# Patient Record
Sex: FEMALE | Race: WHITE | Employment: FULL TIME | URBAN - METROPOLITAN AREA
[De-identification: names, ages, dates, MRNs, and addresses within clinical notes are randomized per-mention and may not be internally consistent; named-entity substitution may affect disease eponyms.]

---

## 2017-02-21 ENCOUNTER — TELEPHONE (OUTPATIENT)
Dept: FAMILY MEDICINE | Facility: CLINIC | Age: 52
End: 2017-02-21

## 2017-02-21 NOTE — TELEPHONE ENCOUNTER
Panel Management Review      Patient has the following on her problem list:     Hypertension   Last three blood pressure readings:  BP Readings from Last 3 Encounters:   03/17/16 124/76   02/15/16 138/80   01/05/15 149/89     Blood pressure: Passed    HTN Guidelines:  Age 18-59 BP range:  Less than 140/90  Age 60-85 with Diabetes:  Less than 140/90  Age 60-85 without Diabetes:  less than 150/90      Composite cancer screening  Chart review shows that this patient is due/due soon for the following Pap Smear, Mammogram and Colonoscopy  Summary:    Patient is due/failing the following:   Mammo, pap, colon/FIT    Action needed:   Patient needs office visit for pap. Referral for mammo, colon/FIT    Type of outreach:    Sent letter. - she did not read her last [employer] message that was sent to her    Questions for provider review:    None                                                                                                                                    Laurie DOVE       Chart routed to none .

## 2017-02-21 NOTE — LETTER
[facility]  [address] [id number] [phone]      February 21, 2017      Nata Reyes  [address] [place] [address]        Dear Nata,     As part of [facility]'s commitment to health and wellness we have recently reviewed your chart and your medical record indicates that you are due for one or more of the following:    -- Pap smear. These are recommended every 3 years. Please call our clinic to schedule your pap smear / physical appointment.     -- Mammogram. Please call one of the following numbers to schedule:  [facility] [phone]  [facility] [phone]  [facility] [phone]  [facility] [phone]  [facility] [phone]  [facility] [phone]    -- Colonoscopy or FIT test. Please call one of the following numbers to schedule a colonoscopy:  [facility] [phone]  [facility] [phone]  [facility] [phone]  [facility] [phone] (multiple sites, call for locations)    A colonoscopy is the gold standard but if you are reluctant to do this there is a less invasive and less expensive alternative. FIT (fecal immunochemical testing) is a screening option that is performed on a yearly basis. This is a test to check for blood in the stool, which can be performed in the comfort of your own home. If you are willing to perform this test, we can order the kit for you to  at our lab. When you have completed the test, you simply mail it in the pre-addressed envelope.    Please call us at [phone] if you need an order for a colonoscopy or FIT testing.    Please try to schedule and/or complete the tests above within the next 2-4 weeks.   The number to call to schedule an appointment at [facility] is [phone].    While we work hard to maintain accurate records on all our patients, it is always possible that this notice does not accurately reflect  tests that you may have had. To ensure that we do not continue to send you notices please verify, at your next visit or by a MyChart message, that we have accurate dates of your    tests, even if these were done many years ago.     Working together for your health is our goal.    Thank you for choosing [facility] for your healthcare needs.      Sincerely,     FOREIGN Kay / marcell

## 2017-04-10 ENCOUNTER — TELEPHONE (OUTPATIENT)
Dept: FAMILY MEDICINE | Facility: CLINIC | Age: 52
End: 2017-04-10

## 2017-04-10 DIAGNOSIS — I10 BENIGN ESSENTIAL HYPERTENSION: Primary | ICD-10-CM

## 2017-04-10 DIAGNOSIS — D50.8 OTHER IRON DEFICIENCY ANEMIA: ICD-10-CM

## 2017-04-10 DIAGNOSIS — I10 BENIGN ESSENTIAL HYPERTENSION: ICD-10-CM

## 2017-04-10 DIAGNOSIS — E03.9 HYPOTHYROIDISM (ACQUIRED): ICD-10-CM

## 2017-04-10 DIAGNOSIS — Z13.6 CARDIOVASCULAR SCREENING; LDL GOAL LESS THAN 160: ICD-10-CM

## 2017-04-10 RX ORDER — LISINOPRIL 20 MG/1
20 TABLET ORAL DAILY
Qty: 90 TABLET | Refills: 0 | Status: SHIPPED | OUTPATIENT
Start: 2017-04-10 | End: 2017-07-12

## 2017-04-10 NOTE — TELEPHONE ENCOUNTER
Medication is being filled for 1 time refill only due to:   Over due for office visit and/or labs   ROQUE Flynn  RN/Jorge Agrawal

## 2017-04-10 NOTE — TELEPHONE ENCOUNTER
Please provide patient with a small fill as she has her yearly with Tamia Jarrett on 4/27/17     lisinopril      Last Written Prescription Date: 3-17-16  Last Fill Quantity: 90, # refills: 0  Last Office Visit with G, P or [facility] prescribing provider: 3-17-16 with Tamia Jarrett  Next 5 appointments (look out 90 days)     Apr 27, 2017  7:20 AM CDT   SHORT with ALISSA Sanabria [facility]    [address] [address]   [phone]                   Potassium   Date Value Ref Range Status   02/16/2016 4.1 mmol/L Final     Creatinine   Date Value Ref Range Status   02/16/2016 0.88 mg/dL Final     BP Readings from Last 3 Encounters:   03/17/16 124/76   02/15/16 138/80   01/05/15 149/89     [facility] [place] [facility]

## 2017-04-10 NOTE — TELEPHONE ENCOUNTER
Please order needed labs for patient apt on 4-27-17 for her yearly     Patient will call back or fax in number for her work where she gets free lab work done and orders from Tamia Jarrett will need to be fax there .     [facility]

## 2017-04-25 ENCOUNTER — TRANSFERRED RECORDS (OUTPATIENT)
Dept: HEALTH INFORMATION MANAGEMENT | Facility: CLINIC | Age: 52
End: 2017-04-25

## 2017-04-27 ENCOUNTER — OFFICE VISIT (OUTPATIENT)
Dept: FAMILY MEDICINE | Facility: CLINIC | Age: 52
End: 2017-04-27
Payer: COMMERCIAL

## 2017-04-27 ENCOUNTER — TELEPHONE (OUTPATIENT)
Dept: FAMILY MEDICINE | Facility: CLINIC | Age: 52
End: 2017-04-27

## 2017-04-27 VITALS
HEIGHT: 65 IN | WEIGHT: 125.6 LBS | HEART RATE: 61 BPM | DIASTOLIC BLOOD PRESSURE: 45 MMHG | TEMPERATURE: 96.8 F | BODY MASS INDEX: 20.93 KG/M2 | SYSTOLIC BLOOD PRESSURE: 106 MMHG

## 2017-04-27 DIAGNOSIS — Z12.31 ENCOUNTER FOR SCREENING MAMMOGRAM FOR BREAST CANCER: ICD-10-CM

## 2017-04-27 DIAGNOSIS — I10 BENIGN ESSENTIAL HYPERTENSION: Primary | ICD-10-CM

## 2017-04-27 DIAGNOSIS — Z11.59 NEED FOR HEPATITIS C SCREENING TEST: ICD-10-CM

## 2017-04-27 DIAGNOSIS — E03.9 HYPOTHYROIDISM (ACQUIRED): ICD-10-CM

## 2017-04-27 DIAGNOSIS — Z12.11 SPECIAL SCREENING FOR MALIGNANT NEOPLASMS, COLON: ICD-10-CM

## 2017-04-27 PROCEDURE — 99213 OFFICE O/P EST LOW 20 MIN: CPT | Performed by: NURSE PRACTITIONER

## 2017-04-27 RX ORDER — LOSARTAN POTASSIUM 100 MG/1
100 TABLET ORAL DAILY
Qty: 90 TABLET | Refills: 3 | Status: SHIPPED | OUTPATIENT
Start: 2017-04-27 | End: 2018-01-30

## 2017-04-27 NOTE — TELEPHONE ENCOUNTER
Please abstract the following data from this visit with this patient into the appropriate field in Epic:    Labs collected by [facility] 4/25/17    Basic Metabolic Panel   Sodium 139  (136-145)  mmol/L   Potassium  4.3 (3.5-5.1)  mmolL   Chloride 104 () mmol/L   CO2   26 (20-29)   mmol/L    Anion Gap  9 (7-16)  mmol/L   (calc.)   Glucose,  84 () mg/dl   Random       Calcium 9.8 (804-10.2) mg/dl   BUN  14 (7-26)  mg/dl   Creatinine 0.81 (0.55-1.02) mg/dl     GFR, Est. >60 (>60)  ml/min/1.73m2   GFT Est., >60 (60)  ml/min/1.73m2   if Black     Iron Profile   Iron  54 () mcg/dl   Transferrin 296 (180-382) mg/dl   TIBC,  370 (240-430) mcg/dl   Calculated,   % Saturation,   calc.      Lipid, reflex direct LDL   Hours Fasting 12   Hours   Cholesterol 182 ()  mg/dl   Triglyceride 92 (0-149)  mg/dl   HDL chol. 71 (>40)  mg/dl   LDL, calc. 93 (0-129)     mg/dl   Non HDL  111   mg/dl   Chol, calc.      Microalb/Creat Ratio     Microalbumin, <5.0   mg/dl   Ur Random    Creatinine,  50   mg/dl   Ur, Random    Ratio   (<30)  mg/g creatinine     Unable to calculate    Ferritin   15 (9-204)  ng/ml     TSH, Sensitive 0.36 (0.30-4.50) IIU/ml      All information taken directly from fax received from [facility].  Karyn Mercedes CMA

## 2017-04-27 NOTE — MR AVS SNAPSHOT
After Visit Summary   4/27/2017    Nata Reyes    MRN: [id number]           Patient Information     Date Of Birth          1965        Visit Information        Provider Department      4/27/2017 7:20 AM Tamia Jarrett APRN [facility]        Today's Diagnoses     Special screening for malignant neoplasms, colon    -  1    Benign essential hypertension        Hypothyroidism (acquired)        Encounter for screening mammogram for breast cancer        Need for hepatitis C screening test          Care Instructions    I did change your blood pressure pill from Lisinopril to  Losartan     Stay well hydrated - urine should be clear.      Monitor your blood pressure to make sure your blood pressure is still controlled.     Get your mammogram and colonoscopy done         Follow-ups after your visit        Additional Services     GASTROENTEROLOGY ADULT REF CONSULT ONLY       Preferred Location: [facility] [phone] or   [facility]  [address] [place] [address] [phone]        Please be aware that coverage of these services is subject to the terms and limitations of your health insurance plan.  Call member services at your health plan with any benefit or coverage questions.  Any procedures must be performed at a [facility] facility OR coordinated by your clinic's referral office.    Please bring the following with you to your appointment:    (1) Any X-Rays, CTs or MRIs which have been performed.  Contact the facility where they were done to arrange for  prior to your scheduled appointment.    (2) List of current medications   (3) This referral request   (4) Any documents/labs given to you for this referral                  Future tests that were ordered for you today     Open Future Orders        Priority Expected Expires Ordered    *MA Screening Digital Bilateral Routine  4/27/2018 4/27/2017            Who to  "contact     Normal or non-critical lab and imaging results will be communicated to you by [employer], letter or phone within 4 business days after the clinic has received the results. If you do not hear from us within 7 days, please contact the clinic through [employer] or phone. If you have a critical or abnormal lab result, we will notify you by phone as soon as possible.  Submit refill requests through [employer] or call your pharmacy and they will forward the refill request to us. Please allow 3 business days for your refill to be completed.          If you need to speak with a  for additional information , please call: [phone]           Additional Information About Your Visit        [employer] Information     [employer] gives you secure access to your electronic health record. If you see a primary care provider, you can also send messages to your care team and make appointments. If you have questions, please call your primary care clinic.  If you do not have a primary care provider, please call [phone] and they will assist you.        Care EveryWhere ID     This is your Care EveryWhere ID. This could be used by other organizations to access your [facility] medical records  [id number]        Your Vitals Were     Pulse Temperature Height Last Period BMI (Body Mass Index)       61 96.8  F (36  C) (Tympanic) 5' 4.5\" (1.638 m) 04/11/2017 (Approximate) 21.23 kg/m2        Blood Pressure from Last 3 Encounters:   04/27/17 106/45   03/17/16 124/76   02/15/16 138/80    Weight from Last 3 Encounters:   04/27/17 125 lb 9.6 oz (57 kg)   03/17/16 129 lb (58.5 kg)   02/15/16 133 lb (60.3 kg)              We Performed the Following     GASTROENTEROLOGY ADULT REF CONSULT ONLY     Hepatitis C Screen Reflex to HCV RNA Quant and Genotype          Today's Medication Changes          These changes are accurate as of: 4/27/17  7:54 AM.  If you have any questions, ask your nurse or doctor.               Start taking these " medicines.        Dose/Directions    losartan 100 MG tablet   Commonly known as:  COZAAR   Used for:  Benign essential hypertension   Started by:  Tamia Jarrett APRN CNP        Dose:  100 mg   Take 1 tablet (100 mg) by mouth daily   Quantity:  90 tablet   Refills:  3         These medicines have changed or have updated prescriptions.        Dose/Directions    lisinopril 20 MG tablet   Commonly known as:  PRINIVIL/ZESTRIL   This may have changed:  Another medication with the same name was removed. Continue taking this medication, and follow the directions you see here.   Used for:  Benign essential hypertension   Changed by:  Tamia Jarrett APRN CNP        Dose:  20 mg   Take 1 tablet (20 mg) by mouth daily   Quantity:  90 tablet   Refills:  0            Where to get your medicines      These medications were sent to [id number] - [place] [[facility] [place] - [address]  [address] [place] [id number]     Phone:  [phone]     losartan 100 MG tablet                Primary Care Provider Office Phone # Fax #    Rene Blanchard [phone]       [facility] [address] [address]        Thank you!     Thank you for choosing [facility]  for your care. Our goal is always to provide you with excellent care. Hearing back from our patients is one way we can continue to improve our services. Please take a few minutes to complete the written survey that you may receive in the mail after your visit with us. Thank you!             Your Updated Medication List - Protect others around you: Learn how to safely use, store and throw away your medicines at www.disposemymeds.org.          This list is accurate as of: 4/27/17  7:54 AM.  Always use your most recent med list.                   Brand Name Dispense Instructions for use    levothyroxine 100 MCG tablet    SYNTHROID/LEVOTHROID    90 tablet    Take 1 tablet (100 mcg)  by mouth daily       lisinopril 20 MG tablet    PRINIVIL/ZESTRIL    90 tablet    Take 1 tablet (20 mg) by mouth daily       losartan 100 MG tablet    COZAAR    90 tablet    Take 1 tablet (100 mg) by mouth daily

## 2017-04-27 NOTE — PATIENT INSTRUCTIONS
I did change your blood pressure pill from Lisinopril to  Losartan     Stay well hydrated - urine should be clear.      Monitor your blood pressure to make sure your blood pressure is still controlled.     Get your mammogram and colonoscopy done

## 2017-04-27 NOTE — NURSING NOTE
"Chief Complaint   Patient presents with     Hypertension     Thyroid Problem       Initial /45  Pulse 61  Temp 96.8  F (36  C) (Tympanic)  Ht 5' 4.5\" (1.638 m)  Wt 125 lb 9.6 oz (57 kg)  LMP 04/11/2017 (Approximate)  BMI 21.23 kg/m2 Estimated body mass index is 21.23 kg/(m^2) as calculated from the following:    Height as of this encounter: 5' 4.5\" (1.638 m).    Weight as of this encounter: 125 lb 9.6 oz (57 kg).  Medication Reconciliation: complete   Paula Patterson CMA\    "

## 2017-04-27 NOTE — PROGRESS NOTES
SUBJECTIVE:                                                    Nata Reyes is a 51 year old female who presents to clinic today for the following health issues:    Hypertension Follow-up      Outpatient blood pressures Yes.  Checks occasionally and are within normal limits    Low Salt Diet: not monitoring salt       Amount of exercise or physical activity: 2-3 days/week for an average of 30-45 minutes    Problems taking medications regularly: No    Medication side effects: none    Diet: regular (no restrictions)      Is due for mammo and colon     Problem list and histories reviewed & adjusted, as indicated.  Additional history: as documented    Patient Active Problem List   Diagnosis     CARDIOVASCULAR SCREENING; LDL GOAL LESS THAN 160     Oropharyngeal cancer (H)     Hypothyroidism (acquired)     Benign essential hypertension     History reviewed. No pertinent surgical history.    Social History   Substance Use Topics     Smoking status: Former Smoker     Types: Cigarettes     Smokeless tobacco: Never Used     Alcohol use Yes      Comment: Socially     Family History   Problem Relation Age of Onset     C.A.D. Maternal Grandfather      DIABETES No family hx of      Hypertension No family hx of      CEREBROVASCULAR DISEASE No family hx of      Breast Cancer No family hx of      Cancer - colorectal No family hx of      Prostate Cancer No family hx of          Current Outpatient Prescriptions   Medication Sig Dispense Refill     lisinopril (PRINIVIL/ZESTRIL) 20 MG tablet Take 1 tablet (20 mg) by mouth daily 90 tablet 0     levothyroxine (SYNTHROID,LEVOTHROID) 100 MCG tablet Take 1 tablet (100 mcg) by mouth daily 90 tablet 3     lisinopril (PRINIVIL,ZESTRIL) 10 MG tablet Take 1 tablet daily for  4 days then increase to 2 tablets. Daily (Patient not taking: Reported on 4/27/2017) 56 tablet 0     Allergies   Allergen Reactions     Nkda [No Known Drug Allergies]      Seasonal Allergies      BP Readings from Last 3  "Encounters:   04/27/17 106/45   03/17/16 124/76   02/15/16 138/80    Wt Readings from Last 3 Encounters:   04/27/17 125 lb 9.6 oz (57 kg)   03/17/16 129 lb (58.5 kg)   02/15/16 133 lb (60.3 kg)                    Reviewed and updated as needed this visit by clinical staff  Tobacco  Allergies  Meds  Med Hx  Surg Hx  Fam Hx  Soc Hx      Reviewed and updated as needed this visit by Provider         ROS:  C: NEGATIVE for fever, chills, change in weight  E/M: NEGATIVE for ear, mouth and throat problems  R: NEGATIVE for significant cough or SOB  RESP:NEGATIVE for significant cough or SOB, respiratory  and POSITIVE for cough-non productive related to Lisinopril   CV: NEGATIVE for chest pain, palpitations or peripheral edema  GI: NEGATIVE for nausea, abdominal pain, heartburn, or change in bowel habits and DUE for colonoscopy  ENDOCRINE: NEGATIVE for temperature intolerance, skin/hair changes    OBJECTIVE:                                                    /45  Pulse 61  Temp 96.8  F (36  C) (Tympanic)  Ht 5' 4.5\" (1.638 m)  Wt 125 lb 9.6 oz (57 kg)  LMP 04/11/2017 (Approximate)  BMI 21.23 kg/m2  Body mass index is 21.23 kg/(m^2).  GENERAL: healthy, alert and no distress  HENT: ear canals and TM's normal, nose and mouth without ulcers or lesions  NECK: no adenopathy, no asymmetry, masses, or scars and thyroid normal to palpation  RESP: lungs clear to auscultation - no rales, rhonchi or wheezes  CV: regular rate and rhythm, normal S1 S2, no S3 or S4, no murmur, click or rub, no peripheral edema and peripheral pulses strong  ABDOMEN: soft, nontender, no hepatosplenomegaly, no masses and bowel sounds normal  MS: no gross musculoskeletal defects noted, no edema  SKIN: no suspicious lesions or rashes    Diagnostic Test Results:  Results for orders placed or performed in visit on 05/16/16   TSH   Result Value Ref Range    TSH  mcU/mL    Impression    Patient had TSH drawn at [facility] labs on 5/13/16:  Lab " was resulted on 5/16/16 and reads as follows:    TSH, Sensitive     *0.269      [0.300-5.000] uIU/mL            ASSESSMENT/PLAN:                                                      ASSESSMENT/PLAN:      ICD-10-CM    1. Benign essential hypertension I10 losartan (COZAAR) 100 MG tablet   2. Hypothyroidism (acquired) E03.9    3. Special screening for malignant neoplasms, colon Z12.11 GASTROENTEROLOGY ADULT REF CONSULT ONLY   4. Encounter for screening mammogram for breast cancer Z12.31 *MA Screening Digital Bilateral   5. Need for hepatitis C screening test Z11.59 Hepatitis C Screen Reflex to HCV RNA Quant and Genotype     CANCELED: Hepatitis C Screen Reflex to HCV RNA Quant and Genotype       Patient Instructions   I did change your blood pressure pill from Lisinopril to  Losartan     Stay well hydrated - urine should be clear.      Monitor your blood pressure to make sure your blood pressure is still controlled.     Get your mammogram and colonoscopy done                            MEDICATIONS:        - Discontinue Lisinopril        - Start taking Losartan        - Continue other medications without change  See Patient Instructions    EDMAR GARCIA NP, APRN [facility] [address]

## 2017-04-27 NOTE — LETTER
[address] [address]  [phone]        June 4, 2018    Nata Reyes  [address] [address]              Dear Nata Reyes    This is to remind you that your non-fasting lab is due.    You may call our office at [phone] to schedule an appointment.    Please disregard this notice if you have already had your labs drawn or made an appointment.        Sincerely,        Tamia MAXWELL, CNP

## 2017-06-02 DIAGNOSIS — E03.9 HYPOTHYROIDISM (ACQUIRED): ICD-10-CM

## 2017-06-02 NOTE — TELEPHONE ENCOUNTER
Routing refill request to provider for review/approval because:  Labs not current:  TSH    Mary Jung RN

## 2017-06-02 NOTE — TELEPHONE ENCOUNTER
Levothyroxine 100mcg     Last Written Prescription Date: 05/18/2016  Last filled 05/18/2016  Last Office Visit with G, [facility] or [facility] prescribing provider: 04/27/2017 CHRISTOPHER Jarrett        TSH   Date Value Ref Range Status   02/16/2016 67.218 mcU/mL Final

## 2017-06-05 RX ORDER — LEVOTHYROXINE SODIUM 100 UG/1
100 TABLET ORAL DAILY
Qty: 90 TABLET | Refills: 1 | Status: SHIPPED | OUTPATIENT
Start: 2017-06-05 | End: 2017-12-17

## 2017-07-12 DIAGNOSIS — I10 BENIGN ESSENTIAL HYPERTENSION: ICD-10-CM

## 2017-07-12 RX ORDER — LISINOPRIL 20 MG/1
TABLET ORAL
Qty: 90 TABLET | Refills: 2 | Status: SHIPPED | OUTPATIENT
Start: 2017-07-12

## 2017-07-12 NOTE — TELEPHONE ENCOUNTER
Prescription approved per [facility] Refill Protocol or patient Primary care provider (PCP)  ROQUE Flynn RN/Jorge Agrawal

## 2017-07-12 NOTE — TELEPHONE ENCOUNTER
lisinopril (PRINIVIL/ZESTRIL) 20 MG tablet      Last Written Prescription Date: 4/10/17  Last Fill Quantity: 90, # refills: 0  Last Office Visit with [facility], [facility] or [facility] prescribing provider: 4/27/17       Potassium   Date Value Ref Range Status   02/16/2016 4.1 mmol/L Final     Creatinine   Date Value Ref Range Status   02/16/2016 0.88 mg/dL Final     BP Readings from Last 3 Encounters:   04/27/17 106/45   03/17/16 124/76   02/15/16 138/80   \

## 2017-07-29 ENCOUNTER — HEALTH MAINTENANCE LETTER (OUTPATIENT)
Age: 52
End: 2017-07-29

## 2017-09-26 ENCOUNTER — TELEPHONE (OUTPATIENT)
Dept: FAMILY MEDICINE | Facility: CLINIC | Age: 52
End: 2017-09-26

## 2017-09-26 DIAGNOSIS — Z12.11 SPECIAL SCREENING FOR MALIGNANT NEOPLASMS, COLON: Primary | ICD-10-CM

## 2017-09-26 NOTE — TELEPHONE ENCOUNTER
Panel Management Review      Patient has the following on her problem list:     Hypertension   Last three blood pressure readings:  BP Readings from Last 3 Encounters:   04/27/17 106/45   03/17/16 124/76   02/15/16 138/80     Blood pressure: Passed    HTN Guidelines:  Age 18-59 BP range:  Less than 140/90  Age 60-85 with Diabetes:  Less than 140/90  Age 60-85 without Diabetes:  less than 150/90        Composite cancer screening  Chart review shows that this patient is due/due soon for the following Pap Smear, Mammogram and Colonoscopy  Summary:    Patient is due/failing the following:   MAMMOGRAM; PAP; COLONOSCOPY    Action needed:   Patient needs office visit for physical with PAP. and Patient needs referral/order: MAMMOGRAM and COLONOSCOPY.    Type of outreach:    Sent [employer] message.    Questions for provider review:    None                                                                                                                                    Zaynab Espinosa CMA (AAMA)       Chart routed to None .

## 2017-12-17 DIAGNOSIS — E03.9 HYPOTHYROIDISM (ACQUIRED): ICD-10-CM

## 2017-12-18 RX ORDER — LEVOTHYROXINE SODIUM 100 UG/1
TABLET ORAL
Qty: 90 TABLET | Refills: 0 | Status: SHIPPED | OUTPATIENT
Start: 2017-12-18 | End: 2018-03-17

## 2017-12-20 ENCOUNTER — TELEPHONE (OUTPATIENT)
Dept: FAMILY MEDICINE | Facility: CLINIC | Age: 52
End: 2017-12-20

## 2017-12-20 NOTE — TELEPHONE ENCOUNTER
"12/20/2017      Patient Communication Preferences indicate  Do not contact  and/or communication by \"Phone\" is not preferred. Call not required per Outreach team.          Outreach ,  Heavenly Mcintyre    "

## 2018-01-30 ENCOUNTER — OFFICE VISIT (OUTPATIENT)
Dept: OTOLARYNGOLOGY | Facility: CLINIC | Age: 53
End: 2018-01-30
Payer: COMMERCIAL

## 2018-01-30 VITALS
SYSTOLIC BLOOD PRESSURE: 118 MMHG | DIASTOLIC BLOOD PRESSURE: 81 MMHG | HEART RATE: 62 BPM | BODY MASS INDEX: 20.83 KG/M2 | WEIGHT: 125 LBS | TEMPERATURE: 98.1 F | HEIGHT: 65 IN

## 2018-01-30 DIAGNOSIS — H92.01 OTALGIA, RIGHT: Primary | ICD-10-CM

## 2018-01-30 DIAGNOSIS — R68.84 JAW PAIN: ICD-10-CM

## 2018-01-30 PROCEDURE — 99203 OFFICE O/P NEW LOW 30 MIN: CPT | Performed by: OTOLARYNGOLOGY

## 2018-01-30 ASSESSMENT — PAIN SCALES - GENERAL: PAINLEVEL: SEVERE PAIN (7)

## 2018-01-30 NOTE — LETTER
1/30/2018         RE: Nata Reyes  [address]  [place] [address]        Dear Colleague,    Thank you for referring your patient, Nata Reyes, to the [facility]. Please see a copy of my visit note below.        History of Present Illness - Nata Reyes is a 52 year old female who presents with concerns about right ear and jaw pain.  This is been present for approximately 1 month.  She denies any difficulty with chewing or opening her mouth.  She denies any associated hearing changes.  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  This was completed in 2010.  She denies any dental pain but does report tenderness to palpation over the right posterior aspect of the mandibular body.  She denies any malocclusion.    Past Medical History -   Patient Active Problem List   Diagnosis     CARDIOVASCULAR SCREENING; LDL GOAL LESS THAN 160     Oropharyngeal cancer (H)     Hypothyroidism (acquired)     Benign essential hypertension       Current Medications -   Current Outpatient Prescriptions:      levothyroxine (SYNTHROID/LEVOTHROID) 100 MCG tablet, TAKE ONE TABLET BY MOUTH ONE TIME DAILY , Disp: 90 tablet, Rfl: 0     lisinopril (PRINIVIL/ZESTRIL) 20 MG tablet, TAKE ONE TABLET BY MOUTH ONE TIME DAILY , Disp: 90 tablet, Rfl: 2    Allergies -   Allergies   Allergen Reactions     Nkda [No Known Drug Allergies]      Seasonal Allergies        Social History -   Social History     Social History     Marital status:      Spouse name: N/A     Number of children: N/A     Years of education: N/A     Social History Main Topics     Smoking status: Former Smoker     Types: Cigarettes     Smokeless tobacco: Never Used     Alcohol use Yes      Comment: Socially     Drug use: No     Sexual activity: Yes     Partners: Male     Other Topics Concern     Not on file     Social History Narrative       Family History -   Family History   Problem  "Relation Age of Onset     C.A.D. Maternal Grandfather      DIABETES No family hx of      Hypertension No family hx of      CEREBROVASCULAR DISEASE No family hx of      Breast Cancer No family hx of      Cancer - colorectal No family hx of      Prostate Cancer No family hx of        Review of Systems - As per HPI and PMHx, otherwise 7 system review of the head and neck negative. 10+ system review negative.    Physical Exam  /81 (BP Location: Right arm, Patient Position: Sitting, Cuff Size: Adult Regular)  Pulse 62  Temp 98.1  F (36.7  C) (Oral)  Ht 1.638 m (5' 4.5\")  Wt 56.7 kg (125 lb)  BMI 21.12 kg/m2  General - The patient is well nourished and well developed, and appears to have good nutritional status.  Alert and oriented to person and place, answers questions and cooperates with examination appropriately.   Head and Face - Normocephalic and atraumatic, with no gross asymmetry noted of the contour of the facial features.  The facial nerve is intact, with strong symmetric movements.  Voice and Breathing - The patient was breathing comfortably without the use of accessory muscles. There was no wheezing, stridor, or stertor.  The patients voice was clear and strong, and had appropriate pitch and quality.  Ears - Bilateral pinna and EACs with normal appearing overlying skin. Tympanic membrane intact with good mobility on pneumatic otoscopy bilaterally. Bony landmarks of the ossicular chain are normal. The tympanic membranes are normal in appearance. No retraction, perforation, or masses.  No fluid or purulence was seen in the external canal or the middle ear.   Eyes - Extraocular movements intact.  Sclera were not icteric or injected, conjunctiva were pink and moist.  Mouth - Examination of the oral cavity showed pink, healthy oral mucosa. No lesions or ulcerations noted.  The tongue was mobile and midline, and the dentition were in good condition.  Palpation of the base of tongue is normal.  Her tonsils " are absent.  Palpation of the right masseter muscle is tender along its inferior aspect.  Throat - The walls of the oropharynx were smooth, pink, moist, symmetric, and had no lesions or ulcerations.  The tonsillar pillars and soft palate were symmetric.  The uvula was midline on elevation.  Neck -right-sided neck there is evidence of radiation therapy.  Tissues are rather firm.  Her neck is thin and easily palpable.  There are no abnormal neck masses throughout the neck exam.    Nose - External contour is symmetric, no gross deflection or scars.  Nasal mucosa is pink and moist with no abnormal mucus.  The septum was midline and non-obstructive, turbinates of normal size and position.  No polyps, masses, or purulence noted on examination.          Assessment - Nata Reyes is a 52 year old female with right otalgia and right jaw pain.  Ear examination as well as neck and throat examination are normal.  I reassured her that I found no signs of any recurrent cancer.  I suggested that she consider the diagnosis of temporomandibular joint dysfunction.  This can commonly present with ear pain as well as jaw pain.  I recommended that she discuss this possibility with an oral surgeon or her dentist.  She may benefit from physical therapy exercises.  She may also benefit from imaging to ascertain the health of the right mandibular teeth.  I advised her to return promptly should she have any hearing loss, neck masses, or difficulty swallowing.      Dr. Tomasa Fontana MD  Otolaryngology  [facility]        Again, thank you for allowing me to participate in the care of your patient.        Sincerely,        Tomasa Fontana MD

## 2018-01-30 NOTE — NURSING NOTE
"Initial /81 (BP Location: Right arm, Patient Position: Sitting, Cuff Size: Adult Regular)  Pulse 62  Temp 98.1  F (36.7  C) (Oral)  Ht 1.638 m (5' 4.5\")  Wt 56.7 kg (125 lb)  BMI 21.12 kg/m2 Estimated body mass index is 21.12 kg/(m^2) as calculated from the following:    Height as of this encounter: 1.638 m (5' 4.5\").    Weight as of this encounter: 56.7 kg (125 lb). .    Diane Flores CMA    "

## 2018-01-30 NOTE — PROGRESS NOTES
History of Present Illness - Nata Reyes is a 52 year old female who presents with concerns about right ear and jaw pain.  This is been present for approximately 1 month.  She denies any difficulty with chewing or opening her mouth.  She denies any associated hearing changes.  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  This was completed in 2010.  She denies any dental pain but does report tenderness to palpation over the right posterior aspect of the mandibular body.  She denies any malocclusion.    Past Medical History -   Patient Active Problem List   Diagnosis     CARDIOVASCULAR SCREENING; LDL GOAL LESS THAN 160     Oropharyngeal cancer (H)     Hypothyroidism (acquired)     Benign essential hypertension       Current Medications -   Current Outpatient Prescriptions:      levothyroxine (SYNTHROID/LEVOTHROID) 100 MCG tablet, TAKE ONE TABLET BY MOUTH ONE TIME DAILY , Disp: 90 tablet, Rfl: 0     lisinopril (PRINIVIL/ZESTRIL) 20 MG tablet, TAKE ONE TABLET BY MOUTH ONE TIME DAILY , Disp: 90 tablet, Rfl: 2    Allergies -   Allergies   Allergen Reactions     Nkda [No Known Drug Allergies]      Seasonal Allergies        Social History -   Social History     Social History     Marital status:      Spouse name: N/A     Number of children: N/A     Years of education: N/A     Social History Main Topics     Smoking status: Former Smoker     Types: Cigarettes     Smokeless tobacco: Never Used     Alcohol use Yes      Comment: Socially     Drug use: No     Sexual activity: Yes     Partners: Male     Other Topics Concern     Not on file     Social History Narrative       Family History -   Family History   Problem Relation Age of Onset     C.A.D. Maternal Grandfather      DIABETES No family hx of      Hypertension No family hx of      CEREBROVASCULAR DISEASE No family hx of      Breast Cancer No family hx of      Cancer - colorectal No family hx of       "Prostate Cancer No family hx of        Review of Systems - As per HPI and PMHx, otherwise 7 system review of the head and neck negative. 10+ system review negative.    Physical Exam  /81 (BP Location: Right arm, Patient Position: Sitting, Cuff Size: Adult Regular)  Pulse 62  Temp 98.1  F (36.7  C) (Oral)  Ht 1.638 m (5' 4.5\")  Wt 56.7 kg (125 lb)  BMI 21.12 kg/m2  General - The patient is well nourished and well developed, and appears to have good nutritional status.  Alert and oriented to person and place, answers questions and cooperates with examination appropriately.   Head and Face - Normocephalic and atraumatic, with no gross asymmetry noted of the contour of the facial features.  The facial nerve is intact, with strong symmetric movements.  Voice and Breathing - The patient was breathing comfortably without the use of accessory muscles. There was no wheezing, stridor, or stertor.  The patients voice was clear and strong, and had appropriate pitch and quality.  Ears - Bilateral pinna and EACs with normal appearing overlying skin. Tympanic membrane intact with good mobility on pneumatic otoscopy bilaterally. Bony landmarks of the ossicular chain are normal. The tympanic membranes are normal in appearance. No retraction, perforation, or masses.  No fluid or purulence was seen in the external canal or the middle ear.   Eyes - Extraocular movements intact.  Sclera were not icteric or injected, conjunctiva were pink and moist.  Mouth - Examination of the oral cavity showed pink, healthy oral mucosa. No lesions or ulcerations noted.  The tongue was mobile and midline, and the dentition were in good condition.  Palpation of the base of tongue is normal.  Her tonsils are absent.  Palpation of the right masseter muscle is tender along its inferior aspect.  Throat - The walls of the oropharynx were smooth, pink, moist, symmetric, and had no lesions or ulcerations.  The tonsillar pillars and soft palate were " symmetric.  The uvula was midline on elevation.  Neck -right-sided neck there is evidence of radiation therapy.  Tissues are rather firm.  Her neck is thin and easily palpable.  There are no abnormal neck masses throughout the neck exam.    Nose - External contour is symmetric, no gross deflection or scars.  Nasal mucosa is pink and moist with no abnormal mucus.  The septum was midline and non-obstructive, turbinates of normal size and position.  No polyps, masses, or purulence noted on examination.          Assessment - Nata Reyes is a 52 year old female with right otalgia and right jaw pain.  Ear examination as well as neck and throat examination are normal.  I reassured her that I found no signs of any recurrent cancer.  I suggested that she consider the diagnosis of temporomandibular joint dysfunction.  This can commonly present with ear pain as well as jaw pain.  I recommended that she discuss this possibility with an oral surgeon or her dentist.  She may benefit from physical therapy exercises.  She may also benefit from imaging to ascertain the health of the right mandibular teeth.  I advised her to return promptly should she have any hearing loss, neck masses, or difficulty swallowing.      Dr. Tomasa Fontana MD  Otolaryngology  [facility]

## 2018-01-30 NOTE — MR AVS SNAPSHOT
"              After Visit Summary   1/30/2018    Nata Reyes    MRN: [id number]           Patient Information     Date Of Birth          1965        Visit Information        Provider Department      1/30/2018 2:00 PM Tomasa Fontana MD [facility]        Today's Diagnoses     Otalgia, right    -  1    Jaw pain          Care Instructions    Per Physician's instructions            Follow-ups after your visit        Who to contact     If you have questions or need follow up information about today's clinic visit or your schedule please contact [facility] directly at [phone].  Normal or non-critical lab and imaging results will be communicated to you by Profighart, letter or phone within 4 business days after the clinic has received the results. If you do not hear from us within 7 days, please contact the clinic through Profighart or phone. If you have a critical or abnormal lab result, we will notify you by phone as soon as possible.  Submit refill requests through Process Relations or call your pharmacy and they will forward the refill request to us. Please allow 3 business days for your refill to be completed.          Additional Information About Your Visit        MyChart Information     Process Relations gives you secure access to your electronic health record. If you see a primary care provider, you can also send messages to your care team and make appointments. If you have questions, please call your primary care clinic.  If you do not have a primary care provider, please call [phone] and they will assist you.        Care EveryWhere ID     This is your Care EveryWhere ID. This could be used by other organizations to access your [facility] medical records  [id number]        Your Vitals Were     Pulse Temperature Height BMI (Body Mass Index)          62 98.1  F (36.7  C) (Oral) 1.638 m (5' 4.5\") 21.12 kg/m2         Blood Pressure from Last 3 Encounters:   01/30/18 118/81   04/27/17 106/45 "   03/17/16 124/76    Weight from Last 3 Encounters:   01/30/18 56.7 kg (125 lb)   04/27/17 57 kg (125 lb 9.6 oz)   03/17/16 58.5 kg (129 lb)              Today, you had the following     No orders found for display         Today's Medication Changes          These changes are accurate as of 1/30/18  4:49 PM.  If you have any questions, ask your nurse or doctor.               Stop taking these medicines if you haven't already. Please contact your care team if you have questions.     losartan 100 MG tablet   Commonly known as:  COZAAR   Stopped by:  Tomasa Fontana MD                    Primary Care Provider Office Phone # Fax #    Rene Blanchard [phone]       [facility] [address] [address]        Equal Access to Services     LLOYD MAN : Hadii bonita darby Sogabriella, waaxda luqadaha, qaybta kaalmada ying, jose david unger . So [facility] [phone].    ATENCIÓN: Si habla español, tiene a carlin disposición servicios gratuitos de asistencia lingüística. Llame al [phone].    We comply with applicable federal civil rights laws and [place] laws. We do not discriminate on the basis of race, color, national origin, age, disability, sex, sexual orientation, or gender identity.            Thank you!     Thank you for choosing [facility]  for your care. Our goal is always to provide you with excellent care. Hearing back from our patients is one way we can continue to improve our services. Please take a few minutes to complete the written survey that you may receive in the mail after your visit with us. Thank you!             Your Updated Medication List - Protect others around you: Learn how to safely use, store and throw away your medicines at www.disposemymeds.org.          This list is accurate as of 1/30/18  4:49 PM.  Always use your most recent med list.                   Brand Name Dispense Instructions for use Diagnosis     levothyroxine 100 MCG tablet    SYNTHROID/LEVOTHROID    90 tablet    TAKE ONE TABLET BY MOUTH ONE TIME DAILY    Hypothyroidism (acquired)       lisinopril 20 MG tablet    PRINIVIL/ZESTRIL    90 tablet    TAKE ONE TABLET BY MOUTH ONE TIME DAILY    Benign essential hypertension

## 2018-03-13 ENCOUNTER — TELEPHONE (OUTPATIENT)
Dept: FAMILY MEDICINE | Facility: CLINIC | Age: 53
End: 2018-03-13

## 2018-03-13 DIAGNOSIS — Z12.11 SPECIAL SCREENING FOR MALIGNANT NEOPLASMS, COLON: ICD-10-CM

## 2018-03-13 DIAGNOSIS — Z12.31 ENCOUNTER FOR SCREENING MAMMOGRAM FOR MALIGNANT NEOPLASM OF BREAST: Primary | ICD-10-CM

## 2018-03-13 NOTE — LETTER
March 19, 2018      Nata Reyes  [address]  [place] [address]        Dear Nata,     As part of [facility]'s commitment to health and wellness, we periodically look at how well we are taking care of you when you're not sick. Along with your annual physical exams in our office, routine cancer screening is an important early detection tool that we can use together to keep you healthy for a long time.    Our most recent records indicate that you are due for one or more of the following:    -- Pap smear.  These are recommended every 3 years. Please call our clinic to schedule your pap smear / physical appointment.     -- Mammogram. If you have had one outside of [facility] please call to let us know so that we can update your chart.  Please call one of the following numbers to schedule:  [facility] [phone]  [facility] [phone]  [facility] [phone]  [facility] [phone]  [facility] [phone]  [facility] [phone]    -- Colon screen. Colonoscopy or FIT test. One of these tests is recommended at age 50 to screen for colon cancer. If you have had a colon screen outside of [facility] please call us to let us know so we can update your chart.  Please call one of the following numbers to schedule a colonoscopy:  [facility] [phone]  [facility] [phone]  [facility] [phone]  [facility] [phone] (multiple sites, call for locations)    A colonoscopy is the gold standard but if you prefer to do a screening that is less invasive and less expensive there is a test for you! It is called the FIT (fecal immunochemical testing) test. It is a screening option that is performed on a yearly basis. This is a test to check for blood in the stool. This test can be done at home and mailed in (you don't even have to pay for postage). If you are willing to do this test, we can order the kit for you to  at our lab or we  can mail it to you. Remember, it is always better to do something rather than nothing.   Please call us at [phone] if you need an order for a colonoscopy or FIT testing.    While we work hard to maintain accurate records on all our patients, it is always possible that this notice does not accurately reflect a surgery you have had in the past to remove your colon, uterus (hysterectomy) or breast tissue (mastectomy). If we have made this error in your case please accept our apologies. To ensure that we do not continue to send you notices please verify at your next visit that we have accurate dates and locations of your surgical history, even if these were done many years ago.     Again, working together for your health is our goal. If you have any questions or concerns regarding this letter, we'd like to hear from you.    Thank you for choosing [facility] for your healthcare needs.    Sincerely,     FOREIGN Kay/ paula

## 2018-03-13 NOTE — TELEPHONE ENCOUNTER
Panel Management Review      Patient has the following on her problem list:     Hypertension   Last three blood pressure readings:  BP Readings from Last 3 Encounters:   01/30/18 118/81   04/27/17 106/45   03/17/16 124/76     Blood pressure: Passed    HTN Guidelines:  Age 18-59 BP range:  Less than 140/90  Age 60-85 with Diabetes:  Less than 140/90  Age 60-85 without Diabetes:  less than 150/90      Composite cancer screening  Chart review shows that this patient is due/due soon for the following Pap Smear, Mammogram and Colonoscopy  Summary:    Patient is due/failing the following:   MAMMOGRAM; PAP; COLONOSCOPY    Action needed:   Patient needs office visit for physical with PAP and Patient needs referral/order: MAMMOGRAM and COLONOSCOPY    Type of outreach:    Sent letter.    Questions for provider review:    None                                                                                                                                    Zaynab Espinosa CMA (AAMA)       Chart routed to None .

## 2018-03-17 DIAGNOSIS — E03.9 HYPOTHYROIDISM (ACQUIRED): ICD-10-CM

## 2018-03-19 NOTE — TELEPHONE ENCOUNTER
"Requested Prescriptions   Pending Prescriptions Disp Refills     levothyroxine (SYNTHROID/LEVOTHROID) 100 MCG tablet [Pharmacy Med Name: Levothyroxine Sodium Oral Tablet 100 MCG] 90 tablet 0    Last Written Prescription Date:  12/18/2017 #90 x 0  Last filled 12/18/2017  Last office visit: 4/27/2017 CHRISTOPHER Jarrett   Future Office Visit:  None   Sig: TAKE ONE TABLET BY MOUTH ONE TIME DAILY (due now for TSH draw)    Thyroid Protocol Failed    3/17/2018 12:27 PM       Failed - Normal TSH on file in past 12 months    Recent Labs   Lab Test 02/16/16   TSH  67.218             Passed - Patient is 12 years or older       Passed - Recent (12 mo) or future (30 days) visit within the authorizing provider's specialty    Patient had office visit in the last 12 months or has a visit in the next 30 days with authorizing provider or within the authorizing provider's specialty.  See \"Patient Info\" tab in inbasket, or \"Choose Columns\" in Meds & Orders section of the refill encounter.           Passed - No active pregnancy on record    If patient is pregnant or has had a positive pregnancy test, please check TSH.         Passed - No positive pregnancy test in past 12 months    If patient is pregnant or has had a positive pregnancy test, please check TSH.            "

## 2018-03-20 NOTE — TELEPHONE ENCOUNTER
Routing refill request to provider for review/approval because:  Over due for labs. Already received courtesy refills.  Chinyere Moreno RN

## 2018-03-21 RX ORDER — LEVOTHYROXINE SODIUM 100 UG/1
100 TABLET ORAL DAILY
Qty: 90 TABLET | Refills: 0 | Status: SHIPPED | OUTPATIENT
Start: 2018-03-21 | End: 2018-03-30

## 2018-03-26 RX ORDER — LEVOTHYROXINE SODIUM 100 UG/1
100 TABLET ORAL DAILY
Qty: 90 TABLET | Refills: 0 | Status: CANCELLED | OUTPATIENT
Start: 2018-03-26

## 2018-03-26 NOTE — TELEPHONE ENCOUNTER
It looks like the levothyroxine was signed on Wednesday with a local print and we are unable to find which printer this went to, can you please resend this medication to the pharmacy selected? Thank you.  Zaynab Mas CMA ([facility])

## 2018-03-30 RX ORDER — LEVOTHYROXINE SODIUM 100 UG/1
100 TABLET ORAL DAILY
Qty: 90 TABLET | Refills: 0 | Status: SHIPPED | OUTPATIENT
Start: 2018-03-30

## 2019-01-31 ENCOUNTER — TELEPHONE (OUTPATIENT)
Dept: LAB | Facility: CLINIC | Age: 54
End: 2019-01-31

## 2019-06-07 ENCOUNTER — TRANSFERRED RECORDS (OUTPATIENT)
Dept: HEALTH INFORMATION MANAGEMENT | Facility: CLINIC | Age: 54
End: 2019-06-07

## 2019-11-04 ENCOUNTER — HEALTH MAINTENANCE LETTER (OUTPATIENT)
Age: 54
End: 2019-11-04

## 2020-11-22 ENCOUNTER — HEALTH MAINTENANCE LETTER (OUTPATIENT)
Age: 55
End: 2020-11-22

## 2021-09-19 ENCOUNTER — HEALTH MAINTENANCE LETTER (OUTPATIENT)
Age: 56
End: 2021-09-19

## 2021-11-13 ENCOUNTER — HEALTH MAINTENANCE LETTER (OUTPATIENT)
Age: 56
End: 2021-11-13

## 2022-01-08 ENCOUNTER — HEALTH MAINTENANCE LETTER (OUTPATIENT)
Age: 57
End: 2022-01-08

## 2022-11-20 ENCOUNTER — HEALTH MAINTENANCE LETTER (OUTPATIENT)
Age: 57
End: 2022-11-20

## 2023-04-15 ENCOUNTER — HEALTH MAINTENANCE LETTER (OUTPATIENT)
Age: 58
End: 2023-04-15

## 2023-11-25 ENCOUNTER — HEALTH MAINTENANCE LETTER (OUTPATIENT)
Age: 58
End: 2023-11-25